# Patient Record
Sex: FEMALE | Race: WHITE | NOT HISPANIC OR LATINO
[De-identification: names, ages, dates, MRNs, and addresses within clinical notes are randomized per-mention and may not be internally consistent; named-entity substitution may affect disease eponyms.]

---

## 2017-03-29 ENCOUNTER — APPOINTMENT (OUTPATIENT)
Dept: INTERNAL MEDICINE | Facility: CLINIC | Age: 27
End: 2017-03-29

## 2017-03-29 VITALS
DIASTOLIC BLOOD PRESSURE: 60 MMHG | SYSTOLIC BLOOD PRESSURE: 120 MMHG | HEART RATE: 66 BPM | RESPIRATION RATE: 14 BRPM | WEIGHT: 117 LBS | BODY MASS INDEX: 22.67 KG/M2 | HEIGHT: 60.25 IN | OXYGEN SATURATION: 97 % | TEMPERATURE: 98.5 F

## 2019-01-28 DIAGNOSIS — R30.0 DYSURIA: ICD-10-CM

## 2019-04-09 ENCOUNTER — APPOINTMENT (OUTPATIENT)
Dept: INTERNAL MEDICINE | Facility: CLINIC | Age: 29
End: 2019-04-09
Payer: COMMERCIAL

## 2019-04-09 VITALS
OXYGEN SATURATION: 98 % | DIASTOLIC BLOOD PRESSURE: 70 MMHG | BODY MASS INDEX: 23.75 KG/M2 | HEIGHT: 60 IN | TEMPERATURE: 98.1 F | HEART RATE: 70 BPM | SYSTOLIC BLOOD PRESSURE: 110 MMHG | WEIGHT: 121 LBS | RESPIRATION RATE: 14 BRPM

## 2019-04-09 PROCEDURE — 99395 PREV VISIT EST AGE 18-39: CPT

## 2019-04-09 NOTE — PHYSICAL EXAM
[Well Developed] : well developed [Well Nourished] : well nourished [No Acute Distress] : no acute distress [Normal Sclera/Conjunctiva] : normal sclera/conjunctiva [PERRL] : pupils equal round and reactive to light [Well-Appearing] : well-appearing [EOMI] : extraocular movements intact [Normal Oropharynx] : the oropharynx was normal [Normal Outer Ear/Nose] : the outer ears and nose were normal in appearance [Supple] : supple [No Lymphadenopathy] : no lymphadenopathy [No JVD] : no jugular venous distention [No Respiratory Distress] : no respiratory distress  [Thyroid Normal, No Nodules] : the thyroid was normal and there were no nodules present [Regular Rhythm] : with a regular rhythm [Normal Rate] : normal rate  [No Accessory Muscle Use] : no accessory muscle use [Clear to Auscultation] : lungs were clear to auscultation bilaterally [No Carotid Bruits] : no carotid bruits [Normal S1, S2] : normal S1 and S2 [No Murmur] : no murmur heard [Pedal Pulses Present] : the pedal pulses are present [No Varicosities] : no varicosities [No Abdominal Bruit] : a ~M bruit was not heard ~T in the abdomen [No Extremity Clubbing/Cyanosis] : no extremity clubbing/cyanosis [No Edema] : there was no peripheral edema [No Palpable Aorta] : no palpable aorta [Non Tender] : non-tender [Soft] : abdomen soft [Non-distended] : non-distended [Normal Bowel Sounds] : normal bowel sounds [No HSM] : no HSM [No Masses] : no abdominal mass palpated [Normal Anterior Cervical Nodes] : no anterior cervical lymphadenopathy [Normal Posterior Cervical Nodes] : no posterior cervical lymphadenopathy [No CVA Tenderness] : no CVA  tenderness [No Spinal Tenderness] : no spinal tenderness [No Joint Swelling] : no joint swelling [Normal Gait] : normal gait [No Rash] : no rash [Grossly Normal Strength/Tone] : grossly normal strength/tone [No Focal Deficits] : no focal deficits [Coordination Grossly Intact] : coordination grossly intact [Deep Tendon Reflexes (DTR)] : deep tendon reflexes were 2+ and symmetric [Normal Affect] : the affect was normal [Normal Insight/Judgement] : insight and judgment were intact

## 2019-05-13 ENCOUNTER — APPOINTMENT (OUTPATIENT)
Dept: CARDIOLOGY | Facility: CLINIC | Age: 29
End: 2019-05-13
Payer: COMMERCIAL

## 2019-05-13 ENCOUNTER — NON-APPOINTMENT (OUTPATIENT)
Age: 29
End: 2019-05-13

## 2019-05-13 VITALS
DIASTOLIC BLOOD PRESSURE: 70 MMHG | HEART RATE: 72 BPM | OXYGEN SATURATION: 99 % | HEIGHT: 60 IN | WEIGHT: 119 LBS | BODY MASS INDEX: 23.36 KG/M2 | SYSTOLIC BLOOD PRESSURE: 110 MMHG

## 2019-05-13 PROCEDURE — 99244 OFF/OP CNSLTJ NEW/EST MOD 40: CPT

## 2019-05-13 PROCEDURE — 93000 ELECTROCARDIOGRAM COMPLETE: CPT

## 2019-05-13 NOTE — DISCUSSION/SUMMARY
[With Me] : with me [FreeTextEntry1] : Merissa is a 29 year old female here for initial evaluation.\par \par She reports multiple near syncopal episodes, that sound primarily vagal in origin.\par \par EKG demonstrates a sinus rhythm without ischemia or chamber enlargement. Her blood pressure is at goal. She is not orthostatic today. Her physical exam is unremarkable. She will have a 2d echo to evaluate for structural heart disease. I stressed the importance of staying hydrated, and to increase her salt intake. We discussed maneuvers that she will try, to avoid passing out when the prodrome begins.\par \par I will call her with results of echocardiogram and discuss follow up and possible pharmacologic therapy.

## 2019-05-13 NOTE — PHYSICAL EXAM
[General Appearance - Well Developed] : well developed [Normal Appearance] : normal appearance [Well Groomed] : well groomed [General Appearance - In No Acute Distress] : no acute distress [No Deformities] : no deformities [General Appearance - Well Nourished] : well nourished [Normal Conjunctiva] : the conjunctiva exhibited no abnormalities [Eyelids - No Xanthelasma] : the eyelids demonstrated no xanthelasmas [Normal Oral Mucosa] : normal oral mucosa [No Oral Pallor] : no oral pallor [No Oral Cyanosis] : no oral cyanosis [Normal Jugular Venous A Waves Present] : normal jugular venous A waves present [Normal Jugular Venous V Waves Present] : normal jugular venous V waves present [No Jugular Venous Sheikh A Waves] : no jugular venous sheikh A waves [Normal Rate] : normal [Normal S1] : normal S1 [S3] : no S3 [Normal S2] : normal S2 [S4] : no S4 [No Murmur] : no murmurs heard [Right Carotid Bruit] : no bruit heard over the right carotid [Left Carotid Bruit] : no bruit heard over the left carotid [Right Femoral Bruit] : no bruit heard over the right femoral artery [Left Femoral Bruit] : no bruit heard over the left femoral artery [2+] : right 2+ [No Abnormalities] : the abdominal aorta was not enlarged and no bruit was heard [No Pitting Edema] : no pitting edema present [Exaggerated Use Of Accessory Muscles For Inspiration] : no accessory muscle use [Respiration, Rhythm And Depth] : normal respiratory rhythm and effort [Auscultation Breath Sounds / Voice Sounds] : lungs were clear to auscultation bilaterally [Abdomen Tenderness] : non-tender [Abdomen Soft] : soft [Abdomen Mass (___ Cm)] : no abdominal mass palpated [Abnormal Walk] : normal gait [Gait - Sufficient For Exercise Testing] : the gait was sufficient for exercise testing [Cyanosis, Localized] : no localized cyanosis [Nail Clubbing] : no clubbing of the fingernails [Petechial Hemorrhages (___cm)] : no petechial hemorrhages [Skin Color & Pigmentation] : normal skin color and pigmentation [] : no rash [Skin Lesions] : no skin lesions [No Venous Stasis] : no venous stasis [No Xanthoma] : no  xanthoma was observed [No Skin Ulcers] : no skin ulcer [Oriented To Time, Place, And Person] : oriented to person, place, and time [Affect] : the affect was normal [Mood] : the mood was normal [No Anxiety] : not feeling anxious

## 2019-05-13 NOTE — HISTORY OF PRESENT ILLNESS
[FreeTextEntry1] : Merissa is a 29 year old female here for initial evaluation.\par \par She reports occasional syncopal episodes over the last few years. She had a syncopal episode at [place] [facility] over the summer. She reports drinking enough water that day,remembers going to urinate and leaving the bathroom. She then had lightheadedness, dizziness, sweating, and woke up on the ground.\par \par More recently, she had a similar episode in [facility]. She did not feel right, and got to the point of passing out.\par She reports occasional stars in her vision, and dizziness, which quickly going from a sitting to standing position.\par \par She reports staying very hydrated. She exercises, and has never had an episode while exercising or at the gym. She denies chest pain, difficulty breathing, palpitations, lower extremity swelling, orthopnea, PND.\par \par She is going through a lot of stress with a divorce. She has a family history of CAD.

## 2019-05-30 ENCOUNTER — APPOINTMENT (OUTPATIENT)
Dept: CARDIOLOGY | Facility: CLINIC | Age: 29
End: 2019-05-30
Payer: COMMERCIAL

## 2019-05-30 PROCEDURE — 93306 TTE W/DOPPLER COMPLETE: CPT

## 2019-07-25 ENCOUNTER — MEDICATION RENEWAL (OUTPATIENT)
Age: 29
End: 2019-07-25

## 2019-08-13 ENCOUNTER — APPOINTMENT (OUTPATIENT)
Dept: CARDIOLOGY | Facility: CLINIC | Age: 29
End: 2019-08-13

## 2020-10-13 ENCOUNTER — APPOINTMENT (OUTPATIENT)
Dept: INTERNAL MEDICINE | Facility: CLINIC | Age: 30
End: 2020-10-13
Payer: COMMERCIAL

## 2020-10-13 VITALS
BODY MASS INDEX: 23.56 KG/M2 | SYSTOLIC BLOOD PRESSURE: 110 MMHG | WEIGHT: 120 LBS | DIASTOLIC BLOOD PRESSURE: 64 MMHG | OXYGEN SATURATION: 98 % | TEMPERATURE: 97.7 F | RESPIRATION RATE: 14 BRPM | HEART RATE: 74 BPM | HEIGHT: 60 IN

## 2020-10-13 PROCEDURE — 99395 PREV VISIT EST AGE 18-39: CPT | Mod: 25

## 2020-10-13 PROCEDURE — G0008: CPT

## 2020-10-13 PROCEDURE — 90686 IIV4 VACC NO PRSV 0.5 ML IM: CPT

## 2020-10-13 NOTE — HEALTH RISK ASSESSMENT
[Very Good] : ~his/her~  mood as very good [No falls in past year] : Patient reported no falls in the past year [0] : 2) Feeling down, depressed, or hopeless: Not at all (0) [Patient reported mammogram was normal] : Patient reported mammogram was normal [MammogramDate] : 10/20

## 2021-05-14 ENCOUNTER — TRANSCRIPTION ENCOUNTER (OUTPATIENT)
Age: 31
End: 2021-05-14

## 2021-08-30 DIAGNOSIS — R22.1 LOCALIZED SWELLING, MASS AND LUMP, NECK: ICD-10-CM

## 2021-09-28 ENCOUNTER — APPOINTMENT (OUTPATIENT)
Dept: PEDIATRIC MEDICAL GENETICS | Facility: CLINIC | Age: 31
End: 2021-09-28
Payer: COMMERCIAL

## 2021-09-28 PROCEDURE — 99203 OFFICE O/P NEW LOW 30 MIN: CPT | Mod: 95

## 2021-10-05 LAB
25(OH)D3 SERPL-MCNC: 42.6 NG/ML
ALBUMIN SERPL ELPH-MCNC: 4.7 G/DL
ALP BLD-CCNC: 68 U/L
ALT SERPL-CCNC: 15 U/L
ANION GAP SERPL CALC-SCNC: 15 MMOL/L
APPEARANCE: CLEAR
AST SERPL-CCNC: 14 U/L
BACTERIA: ABNORMAL
BASOPHILS # BLD AUTO: 0.02 K/UL
BASOPHILS NFR BLD AUTO: 0.4 %
BILIRUB SERPL-MCNC: 0.2 MG/DL
BILIRUBIN URINE: NEGATIVE
BLOOD URINE: NORMAL
BUN SERPL-MCNC: 15 MG/DL
CALCIUM SERPL-MCNC: 10.4 MG/DL
CHLORIDE SERPL-SCNC: 104 MMOL/L
CHOLEST SERPL-MCNC: 177 MG/DL
CO2 SERPL-SCNC: 24 MMOL/L
COLOR: YELLOW
CREAT SERPL-MCNC: 0.64 MG/DL
EOSINOPHIL # BLD AUTO: 0.22 K/UL
EOSINOPHIL NFR BLD AUTO: 4.2 %
ESTIMATED AVERAGE GLUCOSE: 97 MG/DL
FOLATE SERPL-MCNC: >20 NG/ML
GLUCOSE QUALITATIVE U: NEGATIVE
GLUCOSE SERPL-MCNC: 96 MG/DL
HBA1C MFR BLD HPLC: 5 %
HCT VFR BLD CALC: 40.2 %
HDLC SERPL-MCNC: 60 MG/DL
HGB BLD-MCNC: 12.9 G/DL
HYALINE CASTS: 3 /LPF
IMM GRANULOCYTES NFR BLD AUTO: 0.2 %
KETONES URINE: NEGATIVE
LDLC SERPL CALC-MCNC: 88 MG/DL
LEUKOCYTE ESTERASE URINE: NEGATIVE
LYMPHOCYTES # BLD AUTO: 2.21 K/UL
LYMPHOCYTES NFR BLD AUTO: 42.7 %
MAN DIFF?: NORMAL
MCHC RBC-ENTMCNC: 32.1 GM/DL
MCHC RBC-ENTMCNC: 32.9 PG
MCV RBC AUTO: 102.6 FL
MICROSCOPIC-UA: NORMAL
MONOCYTES # BLD AUTO: 0.44 K/UL
MONOCYTES NFR BLD AUTO: 8.5 %
NEUTROPHILS # BLD AUTO: 2.28 K/UL
NEUTROPHILS NFR BLD AUTO: 44 %
NITRITE URINE: NEGATIVE
NONHDLC SERPL-MCNC: 118 MG/DL
PH URINE: 7
PLATELET # BLD AUTO: 277 K/UL
POTASSIUM SERPL-SCNC: 5.7 MMOL/L
PROT SERPL-MCNC: 7.4 G/DL
PROTEIN URINE: NEGATIVE
RBC # BLD: 3.92 M/UL
RBC # FLD: 12.7 %
RED BLOOD CELLS URINE: 4 /HPF
SODIUM SERPL-SCNC: 143 MMOL/L
SPECIFIC GRAVITY URINE: 1.02
SQUAMOUS EPITHELIAL CELLS: 12 /HPF
TRIGL SERPL-MCNC: 149 MG/DL
TSH SERPL-ACNC: 1.64 UIU/ML
UROBILINOGEN URINE: NORMAL
VIT B12 SERPL-MCNC: 592 PG/ML
WBC # FLD AUTO: 5.18 K/UL
WHITE BLOOD CELLS URINE: 4 /HPF

## 2021-10-05 NOTE — CONSULT LETTER
[Dear  ___] : Dear  [unfilled], [Consult Letter:] : I had the pleasure of evaluating your patient, [unfilled]. [Please see my note below.] : Please see my note below. [Sincerely,] : Sincerely, [FreeTextEntry3] : Russell Bloom MD\par

## 2021-10-19 ENCOUNTER — APPOINTMENT (OUTPATIENT)
Dept: INTERNAL MEDICINE | Facility: CLINIC | Age: 31
End: 2021-10-19
Payer: COMMERCIAL

## 2021-10-19 VITALS
SYSTOLIC BLOOD PRESSURE: 110 MMHG | DIASTOLIC BLOOD PRESSURE: 70 MMHG | HEART RATE: 74 BPM | OXYGEN SATURATION: 97 % | WEIGHT: 116 LBS | BODY MASS INDEX: 22.78 KG/M2 | HEIGHT: 60 IN | TEMPERATURE: 97.4 F | RESPIRATION RATE: 14 BRPM

## 2021-10-19 DIAGNOSIS — Z23 ENCOUNTER FOR IMMUNIZATION: ICD-10-CM

## 2021-10-19 PROCEDURE — 99395 PREV VISIT EST AGE 18-39: CPT

## 2021-10-19 NOTE — HISTORY OF PRESENT ILLNESS
[de-identified] : Pt with BRCA 1. Pt followed by breast surgeon\par Had Mammogram, breast and MRI of breast\par Feels.well.

## 2021-10-19 NOTE — HEALTH RISK ASSESSMENT
[Good] : ~his/her~  mood as  good [No falls in past year] : Patient reported no falls in the past year [0] : 2) Feeling down, depressed, or hopeless: Not at all (0) [Patient reported mammogram was normal] : Patient reported mammogram was normal [MammogramDate] : 09/21

## 2021-10-20 LAB
25(OH)D3 SERPL-MCNC: 48 NG/ML
ALBUMIN SERPL ELPH-MCNC: 5 G/DL
ALP BLD-CCNC: 69 U/L
ALT SERPL-CCNC: 15 U/L
ANION GAP SERPL CALC-SCNC: 14 MMOL/L
ANION GAP SERPL CALC-SCNC: 14 MMOL/L
APPEARANCE: CLEAR
AST SERPL-CCNC: 13 U/L
BACTERIA: NEGATIVE
BASOPHILS # BLD AUTO: 0.02 K/UL
BASOPHILS NFR BLD AUTO: 0.3 %
BILIRUB SERPL-MCNC: 0.2 MG/DL
BILIRUBIN URINE: NEGATIVE
BLOOD URINE: NEGATIVE
BUN SERPL-MCNC: 17 MG/DL
BUN SERPL-MCNC: 18 MG/DL
CALCIUM SERPL-MCNC: 10.2 MG/DL
CALCIUM SERPL-MCNC: 10.3 MG/DL
CHLORIDE SERPL-SCNC: 100 MMOL/L
CHLORIDE SERPL-SCNC: 101 MMOL/L
CHOLEST SERPL-MCNC: 206 MG/DL
CO2 SERPL-SCNC: 24 MMOL/L
CO2 SERPL-SCNC: 24 MMOL/L
COLOR: NORMAL
CREAT SERPL-MCNC: 0.55 MG/DL
CREAT SERPL-MCNC: 0.57 MG/DL
EOSINOPHIL # BLD AUTO: 0.19 K/UL
EOSINOPHIL NFR BLD AUTO: 3 %
ESTIMATED AVERAGE GLUCOSE: 97 MG/DL
FOLATE SERPL-MCNC: 16.8 NG/ML
GLUCOSE QUALITATIVE U: NEGATIVE
GLUCOSE SERPL-MCNC: 88 MG/DL
GLUCOSE SERPL-MCNC: 90 MG/DL
HBA1C MFR BLD HPLC: 5 %
HCT VFR BLD CALC: 42.3 %
HDLC SERPL-MCNC: 71 MG/DL
HGB BLD-MCNC: 13.9 G/DL
HYALINE CASTS: 1 /LPF
IMM GRANULOCYTES NFR BLD AUTO: 0.2 %
KETONES URINE: NEGATIVE
LDLC SERPL CALC-MCNC: 111 MG/DL
LEUKOCYTE ESTERASE URINE: NEGATIVE
LYMPHOCYTES # BLD AUTO: 3 K/UL
LYMPHOCYTES NFR BLD AUTO: 46.7 %
MAN DIFF?: NORMAL
MCHC RBC-ENTMCNC: 32.8 PG
MCHC RBC-ENTMCNC: 32.9 GM/DL
MCV RBC AUTO: 99.8 FL
MICROSCOPIC-UA: NORMAL
MONOCYTES # BLD AUTO: 0.36 K/UL
MONOCYTES NFR BLD AUTO: 5.6 %
NEUTROPHILS # BLD AUTO: 2.84 K/UL
NEUTROPHILS NFR BLD AUTO: 44.2 %
NITRITE URINE: NEGATIVE
NONHDLC SERPL-MCNC: 136 MG/DL
PH URINE: 6.5
PLATELET # BLD AUTO: 276 K/UL
POTASSIUM SERPL-SCNC: 4.5 MMOL/L
POTASSIUM SERPL-SCNC: 4.5 MMOL/L
PROT SERPL-MCNC: 8 G/DL
PROTEIN URINE: NEGATIVE
RBC # BLD: 4.24 M/UL
RBC # FLD: 13.3 %
RED BLOOD CELLS URINE: 2 /HPF
SODIUM SERPL-SCNC: 138 MMOL/L
SODIUM SERPL-SCNC: 139 MMOL/L
SPECIFIC GRAVITY URINE: 1.02
SQUAMOUS EPITHELIAL CELLS: 3 /HPF
TRIGL SERPL-MCNC: 126 MG/DL
TSH SERPL-ACNC: 1.67 UIU/ML
UROBILINOGEN URINE: NORMAL
VIT B12 SERPL-MCNC: 610 PG/ML
WBC # FLD AUTO: 6.42 K/UL
WHITE BLOOD CELLS URINE: 1 /HPF

## 2022-05-04 DIAGNOSIS — Z83.438 FAMILY HISTORY OF OTHER DISORDER OF LIPOPROTEIN METABOLISM AND OTHER LIPIDEMIA: ICD-10-CM

## 2022-05-04 DIAGNOSIS — Z82.49 FAMILY HISTORY OF ISCHEMIC HEART DISEASE AND OTHER DISEASES OF THE CIRCULATORY SYSTEM: ICD-10-CM

## 2022-05-04 DIAGNOSIS — Z82.3 FAMILY HISTORY OF STROKE: ICD-10-CM

## 2022-05-05 ENCOUNTER — NON-APPOINTMENT (OUTPATIENT)
Age: 32
End: 2022-05-05

## 2022-05-05 ENCOUNTER — APPOINTMENT (OUTPATIENT)
Dept: CARDIOLOGY | Facility: CLINIC | Age: 32
End: 2022-05-05
Payer: COMMERCIAL

## 2022-05-05 VITALS — SYSTOLIC BLOOD PRESSURE: 110 MMHG | DIASTOLIC BLOOD PRESSURE: 70 MMHG

## 2022-05-05 VITALS
HEART RATE: 70 BPM | DIASTOLIC BLOOD PRESSURE: 77 MMHG | OXYGEN SATURATION: 100 % | WEIGHT: 115 LBS | SYSTOLIC BLOOD PRESSURE: 128 MMHG | BODY MASS INDEX: 22.58 KG/M2 | HEIGHT: 60 IN

## 2022-05-05 PROCEDURE — 99214 OFFICE O/P EST MOD 30 MIN: CPT

## 2022-05-05 PROCEDURE — 93000 ELECTROCARDIOGRAM COMPLETE: CPT

## 2022-05-05 NOTE — DISCUSSION/SUMMARY
[With Me] : with me [FreeTextEntry1] : Merissa is a 32 year old female here for initial evaluation.\par \par She is in no distress today.  Euvolemic on exam.  ECG illustrates sinus rhythm without evidence of ischemia.  Blood pressure is normal, she is not orthostatic today. Her physical exam is unremarkable.\par 2D ECHO was done in 2019 which was unremarkable\par It is clear that she is susceptible to Vaso Vagal episodes.  In preparation for the warmer months, I advised that she try to remain hydrated, especially during the summer months.  I recommended Gatorade as the preferred fluid of choice due to the higher sodium content.   She should also have a salty snack daily during the warmer months and try to avoid alcoholic beverages when in the heat.\par  Reinforced the use of  isometric maneuvers in an attempt  to avoid passing out when the prodrome begins.\par If symptoms persist despite discussed recommendations, I have instructed her to call the office to discuss next possible steps.\par

## 2022-05-05 NOTE — CARDIOLOGY SUMMARY
[___] : [unfilled] [Normal] : normal [de-identified] : sinus rhythm  [de-identified] : 2019 EF 60%\par NML LV

## 2022-05-05 NOTE — HISTORY OF PRESENT ILLNESS
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 She had a syncopal episode at [facility] over the summer . She reports drinking enough water that day,remembers going to urinate and leaving the bathroom. She then had lightheadedness, dizziness, sweating, and woke up on the ground.\par \par Again that year, she had a similar episode in [facility] for her birthday. She did not feel right, and got to the point of passing out.\par She reports occasional stars in her vision, and dizziness, which quickly going from a sitting to standing position.\par \par She reports staying very hydrated. She exercises, and has never had an episode while exercising or at the gym. She denies chest pain, difficulty breathing, palpitations, lower extremity swelling, orthopnea, PND.\par \par Previously under a lot of stress, She was going through a lot of stress with a divorce. now, she is doing better now \par She has a family history of CAD- father  of MI at 60.\par \par She presents today again for follow up after having a near syncopal episode in 2021 while she was in [place].  [place], when suddenly she felt "shaky" and near faint.  symptoms were similar to how she felt in the past when she was getting ready to faint. She performed all the maneuvers to avoid from fainting such as "placing her head between her legs" and "pressing her arms together."  \par  The temperature at that time was hot, however she states she usually prepares her self prior to being in warm environments to avoid these episodes. She pre hydrated accordingly, had eaten a meal a few hours  prior, made sure she took in a little salt as well. \par Again, this past March, a similar episode happened again without syncope but with same symptom onset .  She describes symptoms as "feeling like her sugar is very low" and she feels really weak once the episode has passed.\par She wants to address this issue in an attempt to avoid this feeling or even fainting with the summer months approaching.

## 2022-05-05 NOTE — REASON FOR VISIT
[Symptom and Test Evaluation] : symptom and test evaluation [Initial Evaluation] : an initial evaluation of [Syncope] : syncope

## 2022-05-26 ENCOUNTER — NON-APPOINTMENT (OUTPATIENT)
Age: 32
End: 2022-05-26

## 2022-08-03 ENCOUNTER — APPOINTMENT (OUTPATIENT)
Dept: CARDIOLOGY | Facility: CLINIC | Age: 32
End: 2022-08-03

## 2022-08-03 ENCOUNTER — NON-APPOINTMENT (OUTPATIENT)
Age: 32
End: 2022-08-03

## 2022-08-03 VITALS
WEIGHT: 115 LBS | HEART RATE: 76 BPM | OXYGEN SATURATION: 98 % | HEIGHT: 60 IN | SYSTOLIC BLOOD PRESSURE: 110 MMHG | DIASTOLIC BLOOD PRESSURE: 74 MMHG | BODY MASS INDEX: 22.58 KG/M2

## 2022-08-03 PROCEDURE — 99214 OFFICE O/P EST MOD 30 MIN: CPT | Mod: 25

## 2022-08-03 PROCEDURE — 93000 ELECTROCARDIOGRAM COMPLETE: CPT

## 2022-08-03 NOTE — HISTORY OF PRESENT ILLNESS
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 She had a syncopal episode at [facility] over the summer 2019. She reports drinking enough water that day,remembers going to urinate and leaving the bathroom. She then had lightheadedness, dizziness, sweating, and woke up on the ground. Again that year, she had a similar episode in [facility] for her birthday. She did not feel right, and got to the point of passing out.\par She reports occasional stars in her vision, and dizziness, which quickly going from a sitting to standing position.\par \par She was last seen by Dr. Garza in . In , she had an episode of near syncope while standing on a mountain. She had another episode in 3/22, where she felt like she was going to faint. She increased water and salt intake, which seems to have stopped each episode.\par \par She continues to have these episode though not as severe, which seem to reliably occur in the heat. She reports staying very hydrated. She exercises, and has never had an episode while exercising or at the gym. She denies chest pain, difficulty breathing, palpitations, lower extremity swelling, orthopnea, PND. Her stress is less overall. 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 She has no episodes while on this.\par \par She has a family history of CAD- father  of MI at 60.\par \par She wants to address this issue in an attempt to avoid this feeling or even fainting with the summer months approaching.

## 2022-08-03 NOTE — DISCUSSION/SUMMARY
[With Me] : with me [___ Month(s)] : in [unfilled] month(s) [FreeTextEntry1] : Merissa is a 32 year old female here for follow up.\par \par She is doing well today, though continues to episodes of near syncope, which seem to be related to the heat. She has been doing her best to increase her salt and water intake, though the episodes continue. She is not orthostatic today.\par \par She has felt better on Paxil in the past, which has shown some promise with reflex syncope, and I think it is fair to retry it. If no improvement this this, increased water and salt intake, we will discuss the option of Florinef.\par \par We again reinforced the use of  isometric maneuvers in an attempt  to avoid passing out when the prodrome begins.\par \par I will see her again in 3 months.\par  [EKG obtained to assist in diagnosis and management of assessed problem(s)] : EKG obtained to assist in diagnosis and management of assessed problem(s)

## 2022-09-09 ENCOUNTER — RX RENEWAL (OUTPATIENT)
Age: 32
End: 2022-09-09

## 2022-10-29 LAB
25(OH)D3 SERPL-MCNC: 39.3 NG/ML
ALBUMIN SERPL ELPH-MCNC: 4.9 G/DL
ALP BLD-CCNC: 89 U/L
ALT SERPL-CCNC: 53 U/L
ANION GAP SERPL CALC-SCNC: 13 MMOL/L
APPEARANCE: CLEAR
AST SERPL-CCNC: 29 U/L
BACTERIA: NEGATIVE
BASOPHILS # BLD AUTO: 0.01 K/UL
BASOPHILS NFR BLD AUTO: 0.2 %
BILIRUB SERPL-MCNC: 0.3 MG/DL
BILIRUBIN URINE: NEGATIVE
BLOOD URINE: NEGATIVE
BUN SERPL-MCNC: 17 MG/DL
CALCIUM SERPL-MCNC: 10.2 MG/DL
CHLORIDE SERPL-SCNC: 99 MMOL/L
CHOLEST SERPL-MCNC: 235 MG/DL
CO2 SERPL-SCNC: 25 MMOL/L
COLOR: NORMAL
CREAT SERPL-MCNC: 0.56 MG/DL
EGFR: 124 ML/MIN/1.73M2
EOSINOPHIL # BLD AUTO: 0.17 K/UL
EOSINOPHIL NFR BLD AUTO: 3.2 %
ESTIMATED AVERAGE GLUCOSE: 100 MG/DL
FOLATE SERPL-MCNC: 7.2 NG/ML
GLUCOSE QUALITATIVE U: NEGATIVE
GLUCOSE SERPL-MCNC: 86 MG/DL
HBA1C MFR BLD HPLC: 5.1 %
HCT VFR BLD CALC: 40 %
HDLC SERPL-MCNC: 85 MG/DL
HGB BLD-MCNC: 13.3 G/DL
HYALINE CASTS: 0 /LPF
IMM GRANULOCYTES NFR BLD AUTO: 0.2 %
KETONES URINE: NEGATIVE
LDLC SERPL CALC-MCNC: 129 MG/DL
LEUKOCYTE ESTERASE URINE: NEGATIVE
LYMPHOCYTES # BLD AUTO: 2.16 K/UL
LYMPHOCYTES NFR BLD AUTO: 40.9 %
MAN DIFF?: NORMAL
MCHC RBC-ENTMCNC: 33.1 PG
MCHC RBC-ENTMCNC: 33.3 GM/DL
MCV RBC AUTO: 99.5 FL
MICROSCOPIC-UA: NORMAL
MONOCYTES # BLD AUTO: 0.35 K/UL
MONOCYTES NFR BLD AUTO: 6.6 %
NEUTROPHILS # BLD AUTO: 2.58 K/UL
NEUTROPHILS NFR BLD AUTO: 48.9 %
NITRITE URINE: NEGATIVE
NONHDLC SERPL-MCNC: 151 MG/DL
PH URINE: 7
PLATELET # BLD AUTO: 320 K/UL
POTASSIUM SERPL-SCNC: 4.5 MMOL/L
PROT SERPL-MCNC: 7.8 G/DL
PROTEIN URINE: NEGATIVE
RBC # BLD: 4.02 M/UL
RBC # FLD: 13.4 %
RED BLOOD CELLS URINE: 3 /HPF
SODIUM SERPL-SCNC: 137 MMOL/L
SPECIFIC GRAVITY URINE: 1.02
SQUAMOUS EPITHELIAL CELLS: 3 /HPF
TRIGL SERPL-MCNC: 110 MG/DL
TSH SERPL-ACNC: 1.78 UIU/ML
UROBILINOGEN URINE: NORMAL
VIT B12 SERPL-MCNC: 1102 PG/ML
WBC # FLD AUTO: 5.28 K/UL
WHITE BLOOD CELLS URINE: 1 /HPF

## 2022-11-09 ENCOUNTER — APPOINTMENT (OUTPATIENT)
Dept: INTERNAL MEDICINE | Facility: CLINIC | Age: 32
End: 2022-11-09

## 2022-11-09 VITALS
HEART RATE: 74 BPM | WEIGHT: 115 LBS | DIASTOLIC BLOOD PRESSURE: 72 MMHG | BODY MASS INDEX: 22.58 KG/M2 | TEMPERATURE: 97.3 F | OXYGEN SATURATION: 98 % | SYSTOLIC BLOOD PRESSURE: 112 MMHG | RESPIRATION RATE: 14 BRPM | HEIGHT: 60 IN

## 2022-11-09 DIAGNOSIS — R79.89 OTHER SPECIFIED ABNORMAL FINDINGS OF BLOOD CHEMISTRY: ICD-10-CM

## 2022-11-09 DIAGNOSIS — E87.5 HYPERKALEMIA: ICD-10-CM

## 2022-11-09 PROCEDURE — 99395 PREV VISIT EST AGE 18-39: CPT

## 2022-11-09 RX ORDER — NORETHINDRONE ACETATE AND ETHINYL ESTRADIOL AND FERROUS FUMARATE 1MG-20(24)
KIT ORAL
Refills: 0 | Status: DISCONTINUED | COMMUNITY
End: 2022-11-09

## 2022-11-09 RX ORDER — NORETHINDRONE ACETATE AND ETHINYL ESTRADIOL AND FERROUS FUMARATE 1MG-20(21)
1-20 KIT ORAL
Qty: 84 | Refills: 0 | Status: DISCONTINUED | COMMUNITY
Start: 2022-07-19 | End: 2022-11-09

## 2022-11-09 NOTE — HEALTH RISK ASSESSMENT
[Very Good] : ~his/her~  mood as very good [0] : 2) Feeling down, depressed, or hopeless: Not at all (0) [[id number]] : 0

## 2022-11-09 NOTE — HISTORY OF PRESENT ILLNESS
[de-identified] : Here today for CPE\par History of BRCA 1- followed by breast surgeon. Mammogram sono and MRI annually

## 2023-01-03 NOTE — REASON FOR VISIT
[Home] : at home, [unfilled] , at the time of the visit. [Medical Office: ([facility])___] : at the medical office located in  [Other:____] : [unfilled] [Verbal consent obtained from patient] : the patient, [unfilled] Interpolation Flap Text: A decision was made to reconstruct the defect utilizing an interpolation axial flap and a staged reconstruction.  A telfa template was made of the defect.  This telfa template was then used to outline the interpolation flap.  The donor area for the pedicle flap was then injected with anesthesia.  The flap was excised through the skin and subcutaneous tissue down to the layer of the underlying musculature.  The interpolation flap was carefully excised within this deep plane to maintain its blood supply.  The edges of the donor site were undermined.   The donor site was closed in a primary fashion.  The pedicle was then rotated into position and sutured.  Once the tube was sutured into place, adequate blood supply was confirmed with blanching and refill.  The pedicle was then wrapped with xeroform gauze and dressed appropriately with a telfa and gauze bandage to ensure continued blood supply and protect the attached pedicle.

## 2023-02-27 ENCOUNTER — NON-APPOINTMENT (OUTPATIENT)
Age: 33
End: 2023-02-27

## 2023-02-27 ENCOUNTER — APPOINTMENT (OUTPATIENT)
Dept: CARDIOLOGY | Facility: CLINIC | Age: 33
End: 2023-02-27
Payer: COMMERCIAL

## 2023-02-27 VITALS
WEIGHT: 121 LBS | OXYGEN SATURATION: 95 % | HEIGHT: 60 IN | HEART RATE: 79 BPM | SYSTOLIC BLOOD PRESSURE: 104 MMHG | DIASTOLIC BLOOD PRESSURE: 65 MMHG | BODY MASS INDEX: 23.75 KG/M2

## 2023-02-27 PROCEDURE — 93000 ELECTROCARDIOGRAM COMPLETE: CPT

## 2023-02-27 PROCEDURE — 99214 OFFICE O/P EST MOD 30 MIN: CPT | Mod: 25

## 2023-02-27 NOTE — DISCUSSION/SUMMARY
[With Me] : with me [___ Month(s)] : in [unfilled] month(s) [FreeTextEntry1] : Merissa is a 32 year old female here for follow up.\par \par She is doing well today, and has not had any other episodes of syncope. \par \par She has felt well on Paxil, which has shown some promise with reflex syncope, and I think it is fair to continue. If no improvement with this over the summer, despite increased water and salt intake, we will discuss the option of Florinef.\par \par We again reinforced the use of  isometric maneuvers in an attempt  to avoid passing out when the prodrome begins.\par \par If no issues, I will see her again in 6-12 months.\par  [EKG obtained to assist in diagnosis and management of assessed problem(s)] : EKG obtained to assist in diagnosis and management of assessed problem(s)

## 2023-02-27 NOTE — HISTORY OF PRESENT ILLNESS
[FreeTextEntry1] : Merissa is a 32 year old female here for initial evaluation of vaso-vagal syncope. She has a family history of CAD- father  of MI at 60.\par \par She reports occasional syncopal episodes over the last few years. She had a syncopal episode at [facility] over the summer . She reports drinking enough water that day,remembers going to urinate and leaving the bathroom. She then had lightheadedness, dizziness, sweating, and woke up on the ground. Again that year, she had a similar episode in [facility] for her birthday. She did not feel right, and got to the point of passing out.\par She reports occasional stars in her vision, and dizziness, which quickly going from a sitting to standing position.\par \par She was seen by Dr. Garza in . In , she had an episode of near syncope [place]. She had another episode in 3/22, where she felt like she was going to faint. She increased water and salt intake, which seems to have stopped each episode.\par \par I last saw her in .\par \par She is doing well today. She is back on Paxil and is tolerating it.\par She has no chest pain, difficulty breathing or palpitations.

## 2024-04-25 ENCOUNTER — NON-APPOINTMENT (OUTPATIENT)
Age: 34
End: 2024-04-25

## 2024-06-18 RX ORDER — PAROXETINE HYDROCHLORIDE 10 MG/1
10 TABLET, FILM COATED ORAL
Qty: 90 | Refills: 0 | Status: ACTIVE | COMMUNITY
Start: 2019-06-04

## 2024-07-01 ENCOUNTER — APPOINTMENT (OUTPATIENT)
Dept: INTERNAL MEDICINE | Facility: CLINIC | Age: 34
End: 2024-07-01
Payer: COMMERCIAL

## 2024-07-01 VITALS
HEIGHT: 60 IN | WEIGHT: 118 LBS | TEMPERATURE: 98.2 F | OXYGEN SATURATION: 98 % | BODY MASS INDEX: 23.16 KG/M2 | HEART RATE: 90 BPM | RESPIRATION RATE: 14 BRPM | SYSTOLIC BLOOD PRESSURE: 106 MMHG | DIASTOLIC BLOOD PRESSURE: 60 MMHG

## 2024-07-01 DIAGNOSIS — R55 SYNCOPE AND COLLAPSE: ICD-10-CM

## 2024-07-01 DIAGNOSIS — Z15.01 GENETIC SUSCEPTIBILITY TO MALIGNANT NEOPLASM OF BREAST: ICD-10-CM

## 2024-07-01 DIAGNOSIS — Z00.00 ENCOUNTER FOR GENERAL ADULT MEDICAL EXAMINATION W/OUT ABNORMAL FINDINGS: ICD-10-CM

## 2024-07-01 DIAGNOSIS — Z15.09 GENETIC SUSCEPTIBILITY TO MALIGNANT NEOPLASM OF BREAST: ICD-10-CM

## 2024-07-01 LAB
ALBUMIN SERPL ELPH-MCNC: 4.7 G/DL
ALP BLD-CCNC: 69 U/L
ALT SERPL-CCNC: 12 U/L
ANION GAP SERPL CALC-SCNC: 15 MMOL/L
AST SERPL-CCNC: 13 U/L
BASOPHILS # BLD AUTO: 0.03 K/UL
BASOPHILS NFR BLD AUTO: 0.3 %
BILIRUB SERPL-MCNC: 0.3 MG/DL
BUN SERPL-MCNC: 12 MG/DL
CALCIUM SERPL-MCNC: 10.5 MG/DL
CHLORIDE SERPL-SCNC: 103 MMOL/L
CHOLEST SERPL-MCNC: 250 MG/DL
CO2 SERPL-SCNC: 24 MMOL/L
CREAT SERPL-MCNC: 0.65 MG/DL
EGFR: 118 ML/MIN/1.73M2
EOSINOPHIL # BLD AUTO: 0.26 K/UL
EOSINOPHIL NFR BLD AUTO: 2.2 %
ESTIMATED AVERAGE GLUCOSE: 91 MG/DL
FOLATE SERPL-MCNC: 17.4 NG/ML
GLUCOSE SERPL-MCNC: 91 MG/DL
HBA1C MFR BLD HPLC: 4.8 %
HCT VFR BLD CALC: 41.8 %
HDLC SERPL-MCNC: 73 MG/DL
HGB BLD-MCNC: 13.7 G/DL
IMM GRANULOCYTES NFR BLD AUTO: 0.3 %
LDLC SERPL CALC-MCNC: 139 MG/DL
LYMPHOCYTES # BLD AUTO: 2.19 K/UL
LYMPHOCYTES NFR BLD AUTO: 18.8 %
MAN DIFF?: NORMAL
MCHC RBC-ENTMCNC: 32.8 GM/DL
MCHC RBC-ENTMCNC: 32.9 PG
MCV RBC AUTO: 100.2 FL
MONOCYTES # BLD AUTO: 0.63 K/UL
MONOCYTES NFR BLD AUTO: 5.4 %
NEUTROPHILS # BLD AUTO: 8.48 K/UL
NEUTROPHILS NFR BLD AUTO: 73 %
NONHDLC SERPL-MCNC: 177 MG/DL
PLATELET # BLD AUTO: 308 K/UL
POTASSIUM SERPL-SCNC: 4.8 MMOL/L
PROT SERPL-MCNC: 7.6 G/DL
RBC # BLD: 4.17 M/UL
RBC # FLD: 13.6 %
SODIUM SERPL-SCNC: 143 MMOL/L
TRIGL SERPL-MCNC: 218 MG/DL
TSH SERPL-ACNC: 2.03 UIU/ML
VIT B12 SERPL-MCNC: 683 PG/ML
WBC # FLD AUTO: 11.63 K/UL

## 2024-07-01 PROCEDURE — 99385 PREV VISIT NEW AGE 18-39: CPT

## 2024-10-17 ENCOUNTER — APPOINTMENT (OUTPATIENT)
Dept: CARDIOLOGY | Facility: CLINIC | Age: 34
End: 2024-10-17

## 2024-12-26 ENCOUNTER — NON-APPOINTMENT (OUTPATIENT)
Age: 34
End: 2024-12-26